# Patient Record
Sex: MALE | Race: ASIAN | NOT HISPANIC OR LATINO | Employment: FULL TIME | URBAN - METROPOLITAN AREA
[De-identification: names, ages, dates, MRNs, and addresses within clinical notes are randomized per-mention and may not be internally consistent; named-entity substitution may affect disease eponyms.]

---

## 2023-03-09 PROBLEM — K21.9 GERD (GASTROESOPHAGEAL REFLUX DISEASE): Status: ACTIVE | Noted: 2023-03-09

## 2023-03-09 PROBLEM — K21.9 CHEST PAIN DUE TO GERD: Status: ACTIVE | Noted: 2023-03-09

## 2023-03-09 PROBLEM — A04.8 H. PYLORI INFECTION: Status: ACTIVE | Noted: 2023-03-09

## 2023-03-09 PROBLEM — R07.9 CHEST PAIN DUE TO GERD: Status: ACTIVE | Noted: 2023-03-09

## 2023-03-09 RX ORDER — OMEPRAZOLE 40 MG/1
1 CAPSULE, DELAYED RELEASE ORAL DAILY
COMMUNITY
Start: 2020-05-20 | End: 2023-06-08 | Stop reason: WASHOUT

## 2023-03-09 RX ORDER — TETRACYCLINE HYDROCHLORIDE 500 MG/1
1 CAPSULE ORAL 4 TIMES DAILY
COMMUNITY
Start: 2020-06-10 | End: 2024-01-10 | Stop reason: WASHOUT

## 2023-03-10 ENCOUNTER — LAB (OUTPATIENT)
Dept: LAB | Facility: LAB | Age: 48
End: 2023-03-10
Payer: COMMERCIAL

## 2023-03-10 ENCOUNTER — OFFICE VISIT (OUTPATIENT)
Dept: PRIMARY CARE | Facility: CLINIC | Age: 48
End: 2023-03-10
Payer: COMMERCIAL

## 2023-03-10 VITALS — BODY MASS INDEX: 21.4 KG/M2 | TEMPERATURE: 97.8 F | WEIGHT: 130.6 LBS

## 2023-03-10 DIAGNOSIS — K21.00 GASTROESOPHAGEAL REFLUX DISEASE WITH ESOPHAGITIS WITHOUT HEMORRHAGE: Primary | ICD-10-CM

## 2023-03-10 DIAGNOSIS — R10.13 EPIGASTRIC PAIN: ICD-10-CM

## 2023-03-10 LAB
ALANINE AMINOTRANSFERASE (SGPT) (U/L) IN SER/PLAS: 16 U/L (ref 10–52)
ALBUMIN (G/DL) IN SER/PLAS: 4.6 G/DL (ref 3.4–5)
ALKALINE PHOSPHATASE (U/L) IN SER/PLAS: 50 U/L (ref 33–120)
ANION GAP IN SER/PLAS: 9 MMOL/L (ref 10–20)
ASPARTATE AMINOTRANSFERASE (SGOT) (U/L) IN SER/PLAS: 23 U/L (ref 9–39)
BASOPHILS (10*3/UL) IN BLOOD BY AUTOMATED COUNT: 0.04 X10E9/L (ref 0–0.1)
BASOPHILS/100 LEUKOCYTES IN BLOOD BY AUTOMATED COUNT: 1.3 % (ref 0–2)
BILIRUBIN TOTAL (MG/DL) IN SER/PLAS: 1 MG/DL (ref 0–1.2)
CALCIUM (MG/DL) IN SER/PLAS: 8.9 MG/DL (ref 8.6–10.3)
CARBON DIOXIDE, TOTAL (MMOL/L) IN SER/PLAS: 33 MMOL/L (ref 21–32)
CHLORIDE (MMOL/L) IN SER/PLAS: 102 MMOL/L (ref 98–107)
CREATININE (MG/DL) IN SER/PLAS: 0.83 MG/DL (ref 0.5–1.3)
EOSINOPHILS (10*3/UL) IN BLOOD BY AUTOMATED COUNT: 0.07 X10E9/L (ref 0–0.7)
EOSINOPHILS/100 LEUKOCYTES IN BLOOD BY AUTOMATED COUNT: 2.3 % (ref 0–6)
ERYTHROCYTE DISTRIBUTION WIDTH (RATIO) BY AUTOMATED COUNT: 13.2 % (ref 11.5–14.5)
ERYTHROCYTE MEAN CORPUSCULAR HEMOGLOBIN CONCENTRATION (G/DL) BY AUTOMATED: 32.4 G/DL (ref 32–36)
ERYTHROCYTE MEAN CORPUSCULAR VOLUME (FL) BY AUTOMATED COUNT: 90 FL (ref 80–100)
ERYTHROCYTES (10*6/UL) IN BLOOD BY AUTOMATED COUNT: 5 X10E12/L (ref 4.5–5.9)
GFR MALE: >90 ML/MIN/1.73M2
GLUCOSE (MG/DL) IN SER/PLAS: 91 MG/DL (ref 74–99)
HEMATOCRIT (%) IN BLOOD BY AUTOMATED COUNT: 45 % (ref 41–52)
HEMOGLOBIN (G/DL) IN BLOOD: 14.6 G/DL (ref 13.5–17.5)
IMMATURE GRANULOCYTES/100 LEUKOCYTES IN BLOOD BY AUTOMATED COUNT: 0 % (ref 0–0.9)
LEUKOCYTES (10*3/UL) IN BLOOD BY AUTOMATED COUNT: 3 X10E9/L (ref 4.4–11.3)
LYMPHOCYTES (10*3/UL) IN BLOOD BY AUTOMATED COUNT: 1.36 X10E9/L (ref 1.2–4.8)
LYMPHOCYTES/100 LEUKOCYTES IN BLOOD BY AUTOMATED COUNT: 45.2 % (ref 13–44)
MONOCYTES (10*3/UL) IN BLOOD BY AUTOMATED COUNT: 0.22 X10E9/L (ref 0.1–1)
MONOCYTES/100 LEUKOCYTES IN BLOOD BY AUTOMATED COUNT: 7.3 % (ref 2–10)
NEUTROPHILS (10*3/UL) IN BLOOD BY AUTOMATED COUNT: 1.32 X10E9/L (ref 1.2–7.7)
NEUTROPHILS/100 LEUKOCYTES IN BLOOD BY AUTOMATED COUNT: 43.9 % (ref 40–80)
PLATELETS (10*3/UL) IN BLOOD AUTOMATED COUNT: 237 X10E9/L (ref 150–450)
POTASSIUM (MMOL/L) IN SER/PLAS: 3.8 MMOL/L (ref 3.5–5.3)
PROTEIN TOTAL: 7.4 G/DL (ref 6.4–8.2)
SODIUM (MMOL/L) IN SER/PLAS: 140 MMOL/L (ref 136–145)
THYROTROPIN (MIU/L) IN SER/PLAS BY DETECTION LIMIT <= 0.05 MIU/L: 1.71 MIU/L (ref 0.44–3.98)
UREA NITROGEN (MG/DL) IN SER/PLAS: 19 MG/DL (ref 6–23)

## 2023-03-10 PROCEDURE — 80053 COMPREHEN METABOLIC PANEL: CPT

## 2023-03-10 PROCEDURE — 99213 OFFICE O/P EST LOW 20 MIN: CPT | Performed by: INTERNAL MEDICINE

## 2023-03-10 PROCEDURE — 84443 ASSAY THYROID STIM HORMONE: CPT

## 2023-03-10 PROCEDURE — 85025 COMPLETE CBC W/AUTO DIFF WBC: CPT

## 2023-03-10 PROCEDURE — 36415 COLL VENOUS BLD VENIPUNCTURE: CPT

## 2023-03-10 PROCEDURE — 1036F TOBACCO NON-USER: CPT | Performed by: INTERNAL MEDICINE

## 2023-03-10 RX ORDER — PANTOPRAZOLE SODIUM 40 MG/1
40 TABLET, DELAYED RELEASE ORAL DAILY
Qty: 30 TABLET | Refills: 1 | Status: SHIPPED | OUTPATIENT
Start: 2023-03-10 | End: 2023-04-03

## 2023-03-10 ASSESSMENT — ENCOUNTER SYMPTOMS
NUMBNESS: 0
PALPITATIONS: 0
MYALGIAS: 0
RESPIRATORY NEGATIVE: 1
DIARRHEA: 0
PSYCHIATRIC NEGATIVE: 1
EYES NEGATIVE: 1
ABDOMINAL DISTENTION: 0
NEUROLOGICAL NEGATIVE: 1
SLEEP DISTURBANCE: 0
JOINT SWELLING: 0
WEAKNESS: 0
HEMATURIA: 0
ACTIVITY CHANGE: 0
VOICE CHANGE: 0
DIFFICULTY URINATING: 0
EYE DISCHARGE: 0
PHOTOPHOBIA: 0
COLOR CHANGE: 0
AGITATION: 0
DYSPHORIC MOOD: 0
APNEA: 0
FREQUENCY: 0
ALLERGIC/IMMUNOLOGIC NEGATIVE: 1
TROUBLE SWALLOWING: 0
BACK PAIN: 0
LIGHT-HEADEDNESS: 0
CHEST TIGHTNESS: 0
SHORTNESS OF BREATH: 0
FLANK PAIN: 0
HEADACHES: 0
CARDIOVASCULAR NEGATIVE: 1
BLOOD IN STOOL: 0
NERVOUS/ANXIOUS: 0
ENDOCRINE NEGATIVE: 1
DIZZINESS: 0
STRIDOR: 0
MUSCULOSKELETAL NEGATIVE: 1
NECK STIFFNESS: 0
HEMATOLOGIC/LYMPHATIC NEGATIVE: 1
RECTAL PAIN: 0
CONFUSION: 0
DYSURIA: 0
SORE THROAT: 0
POLYPHAGIA: 0
TREMORS: 0
WHEEZING: 0
ABDOMINAL PAIN: 1
NECK PAIN: 0
CONSTITUTIONAL NEGATIVE: 1
UNEXPECTED WEIGHT CHANGE: 0
FATIGUE: 0
ANAL BLEEDING: 0
SINUS PRESSURE: 0
COUGH: 0
BRUISES/BLEEDS EASILY: 0
HYPERACTIVE: 0
SEIZURES: 0
SPEECH DIFFICULTY: 0
CONSTIPATION: 0
NAUSEA: 1
APPETITE CHANGE: 0
POLYDIPSIA: 0
RHINORRHEA: 0

## 2023-03-13 ENCOUNTER — LAB (OUTPATIENT)
Dept: LAB | Facility: LAB | Age: 48
End: 2023-03-13
Payer: COMMERCIAL

## 2023-03-13 ENCOUNTER — TELEPHONE (OUTPATIENT)
Dept: PRIMARY CARE | Facility: CLINIC | Age: 48
End: 2023-03-13

## 2023-03-13 DIAGNOSIS — K21.00 GASTROESOPHAGEAL REFLUX DISEASE WITH ESOPHAGITIS WITHOUT HEMORRHAGE: ICD-10-CM

## 2023-03-13 PROCEDURE — 83013 H PYLORI (C-13) BREATH: CPT

## 2023-03-13 NOTE — TELEPHONE ENCOUNTER
Pt needs pre authorization for CT. Pt needs this week. Insurance told Pt that he needs pre auth from  So Pt can have  CT done sooner than March 14th

## 2023-03-14 LAB — H. PYLORI UBIT: NEGATIVE

## 2023-03-16 DIAGNOSIS — R10.13 EPIGASTRIC PAIN: Primary | ICD-10-CM

## 2023-03-16 DIAGNOSIS — R63.4 WEIGHT LOSS: ICD-10-CM

## 2023-03-16 DIAGNOSIS — D72.819 LEUKOPENIA, UNSPECIFIED TYPE: ICD-10-CM

## 2023-04-01 DIAGNOSIS — K21.00 GASTROESOPHAGEAL REFLUX DISEASE WITH ESOPHAGITIS WITHOUT HEMORRHAGE: ICD-10-CM

## 2023-04-03 RX ORDER — PANTOPRAZOLE SODIUM 40 MG/1
TABLET, DELAYED RELEASE ORAL
Qty: 30 TABLET | Refills: 0 | Status: SHIPPED | OUTPATIENT
Start: 2023-04-03 | End: 2024-01-24

## 2023-04-19 ENCOUNTER — APPOINTMENT (OUTPATIENT)
Dept: PRIMARY CARE | Facility: CLINIC | Age: 48
End: 2023-04-19
Payer: COMMERCIAL

## 2023-06-08 ENCOUNTER — LAB (OUTPATIENT)
Dept: LAB | Facility: LAB | Age: 48
End: 2023-06-08
Payer: COMMERCIAL

## 2023-06-08 ENCOUNTER — OFFICE VISIT (OUTPATIENT)
Dept: PRIMARY CARE | Facility: CLINIC | Age: 48
End: 2023-06-08
Payer: COMMERCIAL

## 2023-06-08 VITALS
BODY MASS INDEX: 22.3 KG/M2 | SYSTOLIC BLOOD PRESSURE: 122 MMHG | DIASTOLIC BLOOD PRESSURE: 84 MMHG | TEMPERATURE: 97.6 F | HEART RATE: 74 BPM | WEIGHT: 134 LBS

## 2023-06-08 DIAGNOSIS — K21.00 GASTROESOPHAGEAL REFLUX DISEASE WITH ESOPHAGITIS WITHOUT HEMORRHAGE: Primary | ICD-10-CM

## 2023-06-08 DIAGNOSIS — R07.0 THROAT PAIN: ICD-10-CM

## 2023-06-08 DIAGNOSIS — K21.00 GASTROESOPHAGEAL REFLUX DISEASE WITH ESOPHAGITIS WITHOUT HEMORRHAGE: ICD-10-CM

## 2023-06-08 PROBLEM — I77.4 MEDIAN ARCUATE LIGAMENT SYNDROME (CMS-HCC): Status: ACTIVE | Noted: 2023-06-08

## 2023-06-08 PROCEDURE — 80053 COMPREHEN METABOLIC PANEL: CPT

## 2023-06-08 PROCEDURE — 99214 OFFICE O/P EST MOD 30 MIN: CPT | Performed by: STUDENT IN AN ORGANIZED HEALTH CARE EDUCATION/TRAINING PROGRAM

## 2023-06-08 PROCEDURE — 85025 COMPLETE CBC W/AUTO DIFF WBC: CPT

## 2023-06-08 PROCEDURE — 36415 COLL VENOUS BLD VENIPUNCTURE: CPT

## 2023-06-08 PROCEDURE — 1036F TOBACCO NON-USER: CPT | Performed by: STUDENT IN AN ORGANIZED HEALTH CARE EDUCATION/TRAINING PROGRAM

## 2023-06-08 NOTE — PATIENT INSTRUCTIONS
Please stop at the lab ([address]) to complete your blood and/or urine work that I've ordered for you.    I will contact you with the results at my soonest convenience. I strongly urge you to use [email or web address] as this is the quickest and easiest way to access your results and recieve my correspondences.    Follow up with Dr. Lock and Dr. Emmanuel for workup of the median arcuate ligament syndrome.

## 2023-06-08 NOTE — PROGRESS NOTES
Subjective   Patient ID: Jess Gaines is a 47 y.o. male who presents for No chief complaint on file..    HPI   Complains of mild throat discomfort. Reports swollen glands in his throat that are resolving, much better today than they were a week ago. Also complains of subacute on chronic abdominal pain. See GI notes and vascular notes; there is concern for median arcuate ligament syndrome based on the studies he's had. He has lots of questions about this.     Review of Systems    Objective   /84   Pulse 74   Temp 36.4 °C (97.6 °F)   Wt 60.8 kg (134 lb)   BMI 22.30 kg/m²     Physical Exam  Gen: well developed in NAD. AAO x3.  HEENT: NC/AT. Anicteric sclera, symmetric pupils. MMM no thrush.  Neck: Soft, supple. No LAD. No goiter.   CV: RRR nl s1s2 no m/r/g  Pulm: CTAB no w/r/r, good air exchange  GI: soft NTND BS+ no hsm  Ext: WWP no edema  Neuro: II-XII grossly intact, nonfocal systemic findings  MSK: 5/5 strength b/l UE and LE  Gait: unremarkable    Assessment/Plan   Self limiting URI likely the cause behind his mild swollen glands that have resolved.    Unclear etiology of his abd pain, defer to his specialists on this; there is concern for  median arcuate ligament syndrome based on his studies. Recommend he follow up with pain mgmt and vascular and surgery.

## 2023-06-09 LAB
ALANINE AMINOTRANSFERASE (SGPT) (U/L) IN SER/PLAS: 21 U/L (ref 10–52)
ALBUMIN (G/DL) IN SER/PLAS: 4.8 G/DL (ref 3.4–5)
ALKALINE PHOSPHATASE (U/L) IN SER/PLAS: 57 U/L (ref 33–120)
ANION GAP IN SER/PLAS: 14 MMOL/L (ref 10–20)
ASPARTATE AMINOTRANSFERASE (SGOT) (U/L) IN SER/PLAS: 21 U/L (ref 9–39)
BASOPHILS (10*3/UL) IN BLOOD BY AUTOMATED COUNT: 0.04 X10E9/L (ref 0–0.1)
BASOPHILS/100 LEUKOCYTES IN BLOOD BY AUTOMATED COUNT: 0.8 % (ref 0–2)
BILIRUBIN TOTAL (MG/DL) IN SER/PLAS: 0.7 MG/DL (ref 0–1.2)
CALCIUM (MG/DL) IN SER/PLAS: 9.6 MG/DL (ref 8.6–10.6)
CARBON DIOXIDE, TOTAL (MMOL/L) IN SER/PLAS: 30 MMOL/L (ref 21–32)
CHLORIDE (MMOL/L) IN SER/PLAS: 102 MMOL/L (ref 98–107)
CREATININE (MG/DL) IN SER/PLAS: 0.93 MG/DL (ref 0.5–1.3)
EOSINOPHILS (10*3/UL) IN BLOOD BY AUTOMATED COUNT: 0.09 X10E9/L (ref 0–0.7)
EOSINOPHILS/100 LEUKOCYTES IN BLOOD BY AUTOMATED COUNT: 1.8 % (ref 0–6)
ERYTHROCYTE DISTRIBUTION WIDTH (RATIO) BY AUTOMATED COUNT: 12 % (ref 11.5–14.5)
ERYTHROCYTE MEAN CORPUSCULAR HEMOGLOBIN CONCENTRATION (G/DL) BY AUTOMATED: 32.6 G/DL (ref 32–36)
ERYTHROCYTE MEAN CORPUSCULAR VOLUME (FL) BY AUTOMATED COUNT: 88 FL (ref 80–100)
ERYTHROCYTES (10*6/UL) IN BLOOD BY AUTOMATED COUNT: 4.9 X10E12/L (ref 4.5–5.9)
GFR MALE: >90 ML/MIN/1.73M2
GLUCOSE (MG/DL) IN SER/PLAS: 98 MG/DL (ref 74–99)
HEMATOCRIT (%) IN BLOOD BY AUTOMATED COUNT: 42.9 % (ref 41–52)
HEMOGLOBIN (G/DL) IN BLOOD: 14 G/DL (ref 13.5–17.5)
IMMATURE GRANULOCYTES/100 LEUKOCYTES IN BLOOD BY AUTOMATED COUNT: 0.2 % (ref 0–0.9)
LEUKOCYTES (10*3/UL) IN BLOOD BY AUTOMATED COUNT: 5 X10E9/L (ref 4.4–11.3)
LYMPHOCYTES (10*3/UL) IN BLOOD BY AUTOMATED COUNT: 2.32 X10E9/L (ref 1.2–4.8)
LYMPHOCYTES/100 LEUKOCYTES IN BLOOD BY AUTOMATED COUNT: 46.2 % (ref 13–44)
MONOCYTES (10*3/UL) IN BLOOD BY AUTOMATED COUNT: 0.32 X10E9/L (ref 0.1–1)
MONOCYTES/100 LEUKOCYTES IN BLOOD BY AUTOMATED COUNT: 6.4 % (ref 2–10)
NEUTROPHILS (10*3/UL) IN BLOOD BY AUTOMATED COUNT: 2.24 X10E9/L (ref 1.2–7.7)
NEUTROPHILS/100 LEUKOCYTES IN BLOOD BY AUTOMATED COUNT: 44.6 % (ref 40–80)
NRBC (PER 100 WBCS) BY AUTOMATED COUNT: 0 /100 WBC (ref 0–0)
PLATELETS (10*3/UL) IN BLOOD AUTOMATED COUNT: 224 X10E9/L (ref 150–450)
POTASSIUM (MMOL/L) IN SER/PLAS: 3.8 MMOL/L (ref 3.5–5.3)
PROTEIN TOTAL: 7.4 G/DL (ref 6.4–8.2)
SODIUM (MMOL/L) IN SER/PLAS: 142 MMOL/L (ref 136–145)
UREA NITROGEN (MG/DL) IN SER/PLAS: 25 MG/DL (ref 6–23)

## 2023-06-14 ENCOUNTER — OFFICE VISIT (OUTPATIENT)
Dept: PRIMARY CARE | Facility: CLINIC | Age: 48
End: 2023-06-14
Payer: COMMERCIAL

## 2023-06-14 VITALS
HEART RATE: 81 BPM | SYSTOLIC BLOOD PRESSURE: 128 MMHG | BODY MASS INDEX: 22.63 KG/M2 | TEMPERATURE: 97.5 F | OXYGEN SATURATION: 95 % | WEIGHT: 136 LBS | DIASTOLIC BLOOD PRESSURE: 74 MMHG

## 2023-06-14 DIAGNOSIS — J32.9 SINUSITIS, UNSPECIFIED CHRONICITY, UNSPECIFIED LOCATION: Primary | ICD-10-CM

## 2023-06-14 PROBLEM — I99.8: Status: ACTIVE | Noted: 2023-06-14

## 2023-06-14 PROBLEM — R09.89 ABDOMINAL BRUIT: Status: ACTIVE | Noted: 2023-06-14

## 2023-06-14 PROBLEM — R63.4 WEIGHT LOSS: Status: ACTIVE | Noted: 2023-06-14

## 2023-06-14 PROBLEM — A04.8 H. PYLORI INFECTION: Status: RESOLVED | Noted: 2023-03-09 | Resolved: 2023-06-14

## 2023-06-14 PROBLEM — R10.11 ABDOMINAL PAIN, ACUTE, RIGHT UPPER QUADRANT: Status: ACTIVE | Noted: 2023-06-14

## 2023-06-14 PROCEDURE — 1036F TOBACCO NON-USER: CPT | Performed by: STUDENT IN AN ORGANIZED HEALTH CARE EDUCATION/TRAINING PROGRAM

## 2023-06-14 PROCEDURE — 99214 OFFICE O/P EST MOD 30 MIN: CPT | Performed by: STUDENT IN AN ORGANIZED HEALTH CARE EDUCATION/TRAINING PROGRAM

## 2023-06-14 RX ORDER — FLUTICASONE PROPIONATE 50 MCG
SPRAY, SUSPENSION (ML) NASAL
COMMUNITY
Start: 2023-06-12 | End: 2024-01-25 | Stop reason: ALTCHOICE

## 2023-06-14 RX ORDER — AMOXICILLIN AND CLAVULANATE POTASSIUM 875; 125 MG/1; MG/1
875 TABLET, FILM COATED ORAL 2 TIMES DAILY
Qty: 14 TABLET | Refills: 0 | Status: SHIPPED | OUTPATIENT
Start: 2023-06-14 | End: 2023-06-21

## 2023-06-14 NOTE — PATIENT INSTRUCTIONS
You have a sinus infection, and given the severity and duration of symptoms it is reasonable to treat with antibiotics. I have written for a short course of antibiotics (Augmentin if no penicillin allergy, or Doxycycline if you have a penicillin allergy). Come back in if you aren't feeling better once you have completed the full course.

## 2023-06-15 ENCOUNTER — APPOINTMENT (OUTPATIENT)
Dept: PRIMARY CARE | Facility: CLINIC | Age: 48
End: 2023-06-15
Payer: COMMERCIAL

## 2023-08-04 ENCOUNTER — HOSPITAL ENCOUNTER (OUTPATIENT)
Dept: DATA CONVERSION | Facility: HOSPITAL | Age: 48
End: 2023-08-04
Attending: SURGERY | Admitting: SURGERY
Payer: COMMERCIAL

## 2023-08-04 DIAGNOSIS — Z86.16 PERSONAL HISTORY OF COVID-19: ICD-10-CM

## 2023-08-04 DIAGNOSIS — K59.00 CONSTIPATION, UNSPECIFIED: ICD-10-CM

## 2023-08-04 DIAGNOSIS — R10.84 GENERALIZED ABDOMINAL PAIN: ICD-10-CM

## 2023-08-04 DIAGNOSIS — R10.13 EPIGASTRIC PAIN: ICD-10-CM

## 2023-08-04 DIAGNOSIS — K22.89 OTHER SPECIFIED DISEASE OF ESOPHAGUS: ICD-10-CM

## 2023-08-04 DIAGNOSIS — K21.9 GASTRO-ESOPHAGEAL REFLUX DISEASE WITHOUT ESOPHAGITIS: ICD-10-CM

## 2023-08-04 DIAGNOSIS — Z12.11 ENCOUNTER FOR SCREENING FOR MALIGNANT NEOPLASM OF COLON: ICD-10-CM

## 2023-08-04 DIAGNOSIS — K21.00 GASTRO-ESOPHAGEAL REFLUX DISEASE WITH ESOPHAGITIS, WITHOUT BLEEDING: ICD-10-CM

## 2023-08-04 DIAGNOSIS — K29.50 UNSPECIFIED CHRONIC GASTRITIS WITHOUT BLEEDING: ICD-10-CM

## 2023-08-08 LAB
COMPLETE PATHOLOGY REPORT: NORMAL
CONVERTED CLINICAL DIAGNOSIS-HISTORY: NORMAL
CONVERTED FINAL DIAGNOSIS: NORMAL
CONVERTED FINAL REPORT PDF LINK TO COPY AND PASTE: NORMAL
CONVERTED GROSS DESCRIPTION: NORMAL

## 2023-09-25 ENCOUNTER — APPOINTMENT (OUTPATIENT)
Dept: PRIMARY CARE | Facility: CLINIC | Age: 48
End: 2023-09-25
Payer: COMMERCIAL

## 2023-09-30 NOTE — H&P
History of Present Illness:   History Present Illness:  Reason for surgery: dysphagia   HPI:     48 yo male with progressive dysphagia that started after had covid last year. He also describes burning and worsening dysphagia to  solids. He tried omeprazole without relief. then pantoprazole and sucralfate helped. Through this time he had some intermittent ruq  pain radiating to back. Woke him from sleep once. Sounds like GB even though the u/s showed sludge, hida normal. CT shows  large stool burden otherwise normal. But the stool burden where he is tender. So he has been off the sucralfate and the dysphagia  is worsening. will resume. For the ruq pain advised to avoid fatty fried foods. And then I suspect the tenderness at umbilicus due to  constipation. Will start a bowel regimen to see how he does. For workup will do esophagram/ugi and egd/colon. Wife has colon  cancer gene and he does not know his family hisotry. Possbily will also get a manometry. MALS is not in my differential. Will see him  back after studies.    Allergies:        Allergies:  ·  No Known Allergies :     Home Medication Review:   Home Medications Reviewed: yes     Impression/Procedure:   ·  Impression and Planned Procedure: EGD and colonoscopy with esophagram and UGI       ERAS (Enhanced Recovery After Surgery):  ·  ERAS Patient: yes   ·  CPM/PAT Utilization: yes   ·  Immunonutrition Recovery Drink Utilization: no   ·  Carbohydrate Supplement Drink Utilization: no       Physical Exam by System:    Constitutional: Well developed, awake/alert/oriented  x3, no distress, alert and cooperative  elderly appearing   Eyes: PERRL, EOMI, clear sclera   ENMT: mucous membranes moist, no apparent injury,  no lesions seen   Head/Neck: Neck supple, no apparent injury, thyroid  without mass or tenderness, No JVD, trachea midline, no bruits   Respiratory/Thorax: Patent airways, CTAB, normal  breath sounds with good chest expansion, thorax symmetric    Cardiovascular: Regular, rate and rhythm, no murmurs,  2+ equal pulses of the extremities, normal S 1and S 2   Gastrointestinal: Nondistended, soft, non-tender,  no rebound tenderness or guarding, no masses palpable, no organomegaly, +BS, no bruits   Genitourinary: No Discharge, vesicles or other abnormalities   Musculoskeletal: ROM intact, no joint swelling, normal  strength   Extremities: normal extremities, no cyanosis edema,  contusions or wounds, no clubbing   Neurological: alert and oriented x3, intact senses,  motor, response and reflexes, normal strength   Skin: Warm and dry, no lesions, no rashes     Consent:   COVID-19 Consent:  ·  COVID-19 Risk Consent Surgeon has reviewed key risks related to the risk of terrence COVID-19 and if they contract COVID-19 what the risks are.       Electronic Signatures:  Griselda Grant)  (Signed 04-Aug-2023 06:26)   Authored: History of Present Illness, Allergies, Home  Medication Review, Impression/Procedure, ERAS, Physical Exam, Consent, Note Completion      Last Updated: 04-Aug-2023 06:26 by Griselda Grant)

## 2023-11-22 ENCOUNTER — APPOINTMENT (OUTPATIENT)
Dept: SURGERY | Facility: CLINIC | Age: 48
End: 2023-11-22
Payer: COMMERCIAL

## 2023-12-13 ENCOUNTER — APPOINTMENT (OUTPATIENT)
Dept: SURGERY | Facility: CLINIC | Age: 48
End: 2023-12-13
Payer: COMMERCIAL

## 2024-01-10 ENCOUNTER — OFFICE VISIT (OUTPATIENT)
Dept: PRIMARY CARE | Facility: CLINIC | Age: 49
End: 2024-01-10
Payer: COMMERCIAL

## 2024-01-10 VITALS
HEART RATE: 77 BPM | SYSTOLIC BLOOD PRESSURE: 129 MMHG | TEMPERATURE: 97.1 F | WEIGHT: 140 LBS | BODY MASS INDEX: 21.97 KG/M2 | OXYGEN SATURATION: 97 % | HEIGHT: 67 IN | DIASTOLIC BLOOD PRESSURE: 84 MMHG

## 2024-01-10 DIAGNOSIS — K21.9 LPRD (LARYNGOPHARYNGEAL REFLUX DISEASE): Primary | ICD-10-CM

## 2024-01-10 DIAGNOSIS — J32.9 SINUSITIS, UNSPECIFIED CHRONICITY, UNSPECIFIED LOCATION: ICD-10-CM

## 2024-01-10 PROCEDURE — 99214 OFFICE O/P EST MOD 30 MIN: CPT | Performed by: STUDENT IN AN ORGANIZED HEALTH CARE EDUCATION/TRAINING PROGRAM

## 2024-01-10 PROCEDURE — 1036F TOBACCO NON-USER: CPT | Performed by: STUDENT IN AN ORGANIZED HEALTH CARE EDUCATION/TRAINING PROGRAM

## 2024-01-10 RX ORDER — AZELASTINE HYDROCHLORIDE, FLUTICASONE PROPIONATE 137; 50 UG/1; UG/1
1 SPRAY, METERED NASAL 2 TIMES DAILY
Qty: 23 EACH | Refills: 1 | Status: SHIPPED | OUTPATIENT
Start: 2024-01-10

## 2024-01-10 RX ORDER — AMOXICILLIN AND CLAVULANATE POTASSIUM 875; 125 MG/1; MG/1
875 TABLET, FILM COATED ORAL 2 TIMES DAILY
Qty: 14 TABLET | Refills: 0 | Status: SHIPPED | OUTPATIENT
Start: 2024-01-10 | End: 2024-01-17

## 2024-01-10 NOTE — PROGRESS NOTES
"Subjective   Patient ID: Jess Gaines is a 48 y.o. male who presents for No chief complaint on file..    HPI   Complains of persistent throat discomfort. Reports swollen glands in his throat that are resolving, again he perseverates on these. He also reports hoarse voice compared to baseline. Constantly clearing his throat. Some sinus pressure too. Has had extensive workup with GI, requesting ENT referral for triple endoscopy. Also requesting abx. Using nasal spray.     PMHx, FHx, Social Hx, Surg Hx personally reviewed at this appointment. No pertinent findings and/or changes from prior (if applicable).    ROS: Denies wt gain/loss f/c HA LoC CP SOB NVDC. See HPI above, and scanned sheet (if applicable). All other systems are reviewed and are without complaint.      Review of Systems    Objective   /84   Pulse 77   Temp 36.2 °C (97.1 °F)   Ht 1.702 m (5' 7\")   Wt 63.5 kg (140 lb)   SpO2 97%   BMI 21.93 kg/m²     Physical Exam  Gen: well developed in NAD. AAO x3.  HEENT: NC/AT. Anicteric sclera, symmetric pupils. MMM no thrush.  Neck: Soft, supple. No LAD. No goiter.   CV: RRR nl s1s2 no m/r/g  Pulm: CTAB no w/r/r, good air exchange  GI: soft NTND BS+ no hsm  Ext: WWP no edema  Neuro: II-XII grossly intact, nonfocal systemic findings  MSK: 5/5 strength b/l UE and LE  Gait: unremarkable      Assessment/Plan   Persistent laryngeal and esophageal symptoms. Has mild GERD. Likely LRPD. Given his persistent symptoms, will refer to ENT for further evaluation. Will also treat with abx at pt request.          "

## 2024-01-10 NOTE — PATIENT INSTRUCTIONS
I have referred you to ENT for an ear, nose, throat, or sinus problem. Please call their offices at [phone] to schedule an appointment.     I have added or changed your medications today. See the medication section of this packet for full details.

## 2024-01-18 ENCOUNTER — APPOINTMENT (OUTPATIENT)
Dept: PRIMARY CARE | Facility: CLINIC | Age: 49
End: 2024-01-18
Payer: COMMERCIAL

## 2024-01-24 DIAGNOSIS — K21.00 GASTROESOPHAGEAL REFLUX DISEASE WITH ESOPHAGITIS WITHOUT HEMORRHAGE: ICD-10-CM

## 2024-01-24 RX ORDER — PANTOPRAZOLE SODIUM 40 MG/1
40 TABLET, DELAYED RELEASE ORAL
Qty: 90 TABLET | Refills: 3 | Status: SHIPPED | OUTPATIENT
Start: 2024-01-24 | End: 2024-05-17

## 2024-01-25 ENCOUNTER — OFFICE VISIT (OUTPATIENT)
Dept: OTOLARYNGOLOGY | Facility: HOSPITAL | Age: 49
End: 2024-01-25
Payer: COMMERCIAL

## 2024-01-25 VITALS — WEIGHT: 137.5 LBS | BODY MASS INDEX: 21.58 KG/M2 | TEMPERATURE: 96.8 F | HEIGHT: 67 IN

## 2024-01-25 DIAGNOSIS — R09.82 POST-NASAL DRIP: ICD-10-CM

## 2024-01-25 DIAGNOSIS — R49.0 DYSPHONIA: ICD-10-CM

## 2024-01-25 DIAGNOSIS — K21.9 LPRD (LARYNGOPHARYNGEAL REFLUX DISEASE): ICD-10-CM

## 2024-01-25 DIAGNOSIS — R13.10 DYSPHAGIA, UNSPECIFIED TYPE: Primary | ICD-10-CM

## 2024-01-25 PROCEDURE — 31579 LARYNGOSCOPY TELESCOPIC: CPT | Performed by: OTOLARYNGOLOGY

## 2024-01-25 PROCEDURE — 99214 OFFICE O/P EST MOD 30 MIN: CPT | Performed by: OTOLARYNGOLOGY

## 2024-01-25 PROCEDURE — 99204 OFFICE O/P NEW MOD 45 MIN: CPT | Performed by: OTOLARYNGOLOGY

## 2024-01-25 PROCEDURE — 1036F TOBACCO NON-USER: CPT | Performed by: OTOLARYNGOLOGY

## 2024-01-25 RX ORDER — FLUTICASONE PROPIONATE 50 MCG
2 SPRAY, SUSPENSION (ML) NASAL DAILY
Qty: 16 G | Refills: 11 | Status: SHIPPED | OUTPATIENT
Start: 2024-01-25 | End: 2025-01-24

## 2024-01-25 ASSESSMENT — PATIENT HEALTH QUESTIONNAIRE - PHQ9
2. FEELING DOWN, DEPRESSED OR HOPELESS: NOT AT ALL
SUM OF ALL RESPONSES TO PHQ9 QUESTIONS 1 & 2: 0
1. LITTLE INTEREST OR PLEASURE IN DOING THINGS: NOT AT ALL

## 2024-01-25 NOTE — PROGRESS NOTES
Patient: Jess Gaines   MRN: [id number] YOB: 1975   Sex: male Age: 48 y.o.  Date of Service: 2024       ASSESSMENT AND PLAN  I discussed the findings with Jess Gaines and have recommended the followin. Dysphagia to solids and pills, history of GERD symptoms on PPI, esophagram without significant UES pathology though evaluation was limited.  - MBS ordered  - Follow-up after MBS to review next steps    2. 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 His throat clearing is behavioral as I do not see significant increased mucus today on laryngoscopy. I do not suspect LPRD. I have recommended voice therapy with our SLPs to start  - SLP referral for voice therapy   - Increase hydration and humidification  - Throat clearing and vocal hygiene strategies provided  - Repeat strobe on next visit    3. Post nasal drip, previously improved on flonase  - Flonase prescribed      CHIEF COMPLAINT  Chief Complaint   Patient presents with    lprd     Needs to clear throat constantly  Dry throat  Voice change    Chest Pain     Just finished 7 days of Amoxicillin       HISTORY OF PRESENT ILLNESS  Jess Gaines is a 48 y.o. male referred by Román Patrick MD for evaluation of dysphagia and dysphonia.  The patient reports since 2022 he has been having reflux symptoms and trouble swallowing after COVID. Reflux symptoms since improved after PPI.    The dysphagia history includes:      Dysphagia for solids               yes, daily     Dysphagia for liquids              large amounts of liquid    Dysphagia for pills                  yes, breaks up his medications  Associated weight loss            no    Recent pneumonia/bronchitis  no  GERD/Acid reflux   Yes well controlled on PPI    Also notes some sensation of post nasal drop and sinus pressure.  "That was improved in the past with Flonase, which he is no longer using.    Dysphonia around the same time frame. 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 Denies pain with speaking but does note some irritation in his right neck. Thinks his right submandibular gland might be enlarged. No fevers or overlying erythema noted.    He has previously seen Dr. Grant 8/16/23. EGD 8/4/23 with regular Z line and non severe reflex esophagitis.     SH: never smoker    ADDITIONAL HISTORY  Past Medical History  He has no past medical history on file. Surgical History  He has no past surgical history on file.   Social History  He reports that he has never smoked. He has never used smokeless tobacco. He reports that he does not currently use alcohol. He reports that he does not currently use drugs. Allergies  Patient has no known allergies.     Family History  No family history on file.     REVIEW OF SYSTEMS  All 10 systems were reviewed and negative except for above.      PHYSICAL EXAM  ENT Physical Exam   GENERAL: Well-nourished and developed, alert and appropriate, no distress, voice [id number]  RESPIRATORY: Breathing quietly, no stridor  HEAD: Normocephalic atraumatic  FACE: Symmetric, no masses or lesions  EYES:  Pupils reactive, sclera clear, external ocular muscles intact, no nystagmus.    EARS:  Pinnae normal. External auditory canals clear and tympanic membranes intact.  NOSE:  No anterior lesions, masses or polyps.  ORAL CAVITY/OROPHARYNX:  Buccal mucosa is moist without lesions or masses, tongue midline and palate elevates symmetrically. Tongue mobility intact. Submandibular glands normal to palpation, normal salivary flow  NECK:  Soft. There is no lymphadenopathy or thyromegaly.  Moderated tenderness R>L thyrohyoid space  NEUROLOGIC:  Cranial nerves II-XII grossly intact.       Last Recorded Vitals  Temperature 36 °C (96.8 °F), height 1.702 m (5' 7\"), weight 62.4 kg (137 lb 8 " sharlene.    RESULTS    Patient Reported Outcome Measures  N/A    Laboratory, Radiology, and Pathology  I personally reviewed the following results, with the following interpretation:   Esophagram 8/10/23 - no significant pharyngoesophageal dysfunction, mild intraesophageal reflux          PROCEDURES  Flexible Stroboscopy In Clinic    Date/Time: 1/25/2024 9:30 AM    Performed by: Kiera Serna MD  Authorized by: Kiera Serna MD       Flexible Fiberoptic Laryngoscopy with Stroboscopy    Patient failed a mirror exam due to limitations of equipment and the need for stroboscopy to assess glottic vibration and closure.     PREOPERATIVE DIAGNOSIS: Dysphonia    POSTOPERATIVE DIAGNOSIS: Same    PROCEDURE:  Strobovideolaryngoscopy    ANESTHESIA:  Topical    COMPLICATIONS:  None    SPECIMENS:  None    PROCEDURE IN DETAIL: The patient was seated in an upright position.  The nasal cavity was topically decongested and anesthetized.  The stroboscopic microphone was held to the neck at the level of the larynx.  The distal chip video laryngoscope was passed through the nasal cavity.  The nasal cavity and nasopharynx were within normal limits except noted below.  The following findings on stroboscopy were noted:      Appearance of pharynx/larynx/Other Structural Lesions: small right arytenoid granuloma   Vocal Fold Mobility               Right VF: mobile               Left VF: mobile   TVF Appearance               Edema/Erythema: mild edema               Lesions/vibratory margin irregularities: mild atrophy   Glottic Closure Pattern: complete    Vibration:               Phase: symmetric    Periodicity: regular                Amplitude: normal                Waveform: normal     Muscle Tension Patterns:  moderate   Other abnormal findings: per above    The patient tolerated the procedure well.       ----------------------------------------------------------------------  Kiera Serna MD, MAEd    [facility]  Department of Otolaryngology - Head and Neck Surgery  [facility]    The total time I spent in care of this patient today (excluding time spent on other billable services) is as follows:    Time Spent  Prep time on day of patient encounter: 5 minutes  Time spent directly with patient, family or caregiver: 25 minutes  Additional Time Spent on Patient Care Activities: 5 minutes  Documentation Time: 10 minutes  Other Time Spent: 0 minutes  Total: 45 minutes

## 2024-01-25 NOTE — PATIENT INSTRUCTIONS
"    NASAL SPRAY USE INSTRUCTIONS    · Blow your nose on both sides to clear any debris or mucous  · Prime and activate the delivery device as recommended by the   · Position your head by tilting forward (this aligns the medication more vertically and makes it easier to use all the medication from the bottom of the bottle each month)  · Aim the applicator tip about 30-45 degrees from the floor of the nose and direct the spray to the outer corner of the eye on the same side (right side to outside of the right eye,       left side to outside of left eye)       - This technique helps to avoid spraying the septum (structure that divides one side of the nose from the other) that can cause irritation and bleeding     - Do not spray straight up or straight back into your nose         · NASAL STEROIDS (example: Flonase, Nasacort, Rhinocort) - Consistent utilization is important for maximal benefit (either 1 puff each nostril twice per day or 2 sprays each     nostril once per day)  · TOPICAL ANTIHISTAMINES (example: Azelastine, Patanase) and Ipratropium - can be utilized as needed for symptoms     \"[facility] is pleased to meet your health care needs.  We strive to deliver the highest quality and most value based care possible.  Thank you for giving us the opportunity to serve you.\"      You are a chronic throat clearer and you are not alone! The causes of chronic throat clearing include acid reflux (laryngopharyngeal reflux), allergies, environmental irritants such as tobacco smoke and air pollution, and asthma. If present for a long time, throat clearing can become habit forming. When you clear your throat, you are transferring mucus from your throat up into your mouth and nose. We all secrete up to 2 liters (imagine a big Coke bottle) of mucus a day. This saliva is usually swallowed and eventually ends up in the toilet. By clearing the mucus back into your mouth and nose you are sending secretions in " "the wrong direction, which is counter productive. The mucus will still work its way back down to the throat and eventually be swallowed. So, help the mucus go in the right direction in the first place! Swallow! Swallow! Swallow! No throat clearing.     Chronic throat clearing is damaging. The trauma from the throat clearing can cause redness and swelling of your vocal cords. If the clearing is very excessive small growths (granulomas) can form. The irritation and swelling caused by the clearing can cause saliva to sit in your throat. This causes more throat clearing. More throat clearing causes more stagnant mucus which causes more throat clearing which causes more mucus, etc... A vicious cycle will ensue and the habit can be very difficult to break. Without a conscious effort on your part to break the cycle, the throat clearing may never stop.     Your doctor may prescribe medication and behavioral modifications to treat acid reflux disease. Nose and throat spray may be prescribed to treat underlying allergies or asthma. Medications alone will not solve the problem. The following alterations are recommended:    1). Do not clear your throat. Swallow instead. This gets mucus going in the right direction.     2). Carry around some water to assist with swallowing and mucus clearance. When you feel the urge to clear your throat take a sip of water,    3). If you absolutely need to clear your throat, perform a non-traumatic throat clear. To do this, pant with your mouth open and say \"HUH, HUH, HUH, HUH\" with a powerful but very breathy voice. This will clear the secretions without causing damage.     4).  Increase water intake. This will thin secretions and make it easier to swallow.     5).  Comply with the behavior recommendations for reflux disease.     6). Encourage your friends and family to tell you to swallow when you clear your throat. Some people have been clearing so long that they don't even realize they are " doing it.     7).  Increase humidification: a dry environment can contribute to dry irritated tissues.  Use a cool mist humidifier at night and limit the use of heaters and air conditioning.     8). Don't overuse your voice, as this can also contribute to throat irritation. Limit loud talking as well as cell phone conversations.     9).  Be patient. The urge to clear your throat will not go away overnight. It may take 8 to 12 weeks for the medication and behavior modifications to work.     GOOD LUCK!

## 2024-01-25 NOTE — LETTER
2024     Román Weathers MD  [address] [facility] [address]    Patient: Jess Gaines   YOB: 1975   Date of Visit: 2024       Dear Dr. Román Weathers MD:    Thank you for referring Jess Gaines to me for evaluation. Below are my notes for this consultation.  If you have questions, please do not hesitate to call me. I look forward to following your patient along with you.       Sincerely,     Kiera Serna MD      CC: No Recipients  ______________________________________________________________________________________    Patient: Jess Gaines   MRN: [id number] YOB: 1975   Sex: male Age: 48 y.o.  Date of Service: 2024       ASSESSMENT AND PLAN  I discussed the findings with Jess Gaines and have recommended the followin. Dysphagia to solids and pills, history of GERD symptoms on PPI, esophagram without significant UES pathology though evaluation was limited.  - MBS ordered  - Follow-up after MBS to review next steps    2. 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 His throat clearing is behavioral as I do not see significant increased mucus today on laryngoscopy. I do not suspect LPRD. I have recommended voice therapy with our SLPs to start  - SLP referral for voice therapy   - Increase hydration and humidification  - Throat clearing and vocal hygiene strategies provided  - Repeat strobe on next visit    3. Post nasal drip, previously improved on flonase  - Flonase prescribed      CHIEF COMPLAINT  Chief Complaint   Patient presents with   • lprd     Needs to clear throat constantly  Dry throat  Voice change   • Chest Pain     Just finished 7 days of Amoxicillin       HISTORY OF PRESENT ILLNESS  Jess Gaines is a 48 y.o. male referred by Román Patrick MD for evaluation of dysphagia and  dysphonia.  The patient reports since fall 2022 he has been having reflux symptoms and trouble swallowing after COVID. Reflux symptoms since improved after PPI.    The dysphagia history includes:      Dysphagia for solids               yes, daily     Dysphagia for liquids              large amounts of liquid    Dysphagia for pills                  yes, breaks up his medications  Associated weight loss            no    Recent pneumonia/bronchitis  no  GERD/Acid reflux   Yes well controlled on PPI    Also notes some sensation of post nasal drop and sinus pressure. That was improved in the past with Flonase, which he is no longer using.    Dysphonia around the same time frame. 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 Denies pain with speaking but does note some irritation in his right neck. Thinks his right submandibular gland might be enlarged. No fevers or overlying erythema noted.    He has previously seen Dr. Grant 8/16/23. EGD 8/4/23 with regular Z line and non severe reflex esophagitis.     SH: never smoker    ADDITIONAL HISTORY  Past Medical History  He has no past medical history on file. Surgical History  He has no past surgical history on file.   Social History  He reports that he has never smoked. He has never used smokeless tobacco. He reports that he does not currently use alcohol. He reports that he does not currently use drugs. Allergies  Patient has no known allergies.     Family History  No family history on file.     REVIEW OF SYSTEMS  All 10 systems were reviewed and negative except for above.      PHYSICAL EXAM  ENT Physical Exam   GENERAL: Well-nourished and developed, alert and appropriate, no distress, voice [id number]  RESPIRATORY: Breathing quietly, no stridor  HEAD: Normocephalic atraumatic  FACE: Symmetric, no masses or lesions  EYES:  Pupils reactive, sclera clear, external ocular muscles intact, no nystagmus.    EARS:  Pinnae normal. External  "auditory canals clear and tympanic membranes intact.  NOSE:  No anterior lesions, masses or polyps.  ORAL CAVITY/OROPHARYNX:  Buccal mucosa is moist without lesions or masses, tongue midline and palate elevates symmetrically. Tongue mobility intact. Submandibular glands normal to palpation, normal salivary flow  NECK:  Soft. There is no lymphadenopathy or thyromegaly.  Moderated tenderness R>L thyrohyoid space  NEUROLOGIC:  Cranial nerves II-XII grossly intact.       Last Recorded Vitals  Temperature 36 °C (96.8 °F), height 1.702 m (5' 7\"), weight 62.4 kg (137 lb 8 oz).    RESULTS    Patient Reported Outcome Measures  N/A    Laboratory, Radiology, and Pathology  I personally reviewed the following results, with the following interpretation:   Esophagram 8/10/23 - no significant pharyngoesophageal dysfunction, mild intraesophageal reflux          PROCEDURES  Flexible Stroboscopy In Clinic    Date/Time: 1/25/2024 9:30 AM    Performed by: Kiera Serna MD  Authorized by: Kiera Serna MD       Flexible Fiberoptic Laryngoscopy with Stroboscopy    Patient failed a mirror exam due to limitations of equipment and the need for stroboscopy to assess glottic vibration and closure.     PREOPERATIVE DIAGNOSIS: Dysphonia    POSTOPERATIVE DIAGNOSIS: Same    PROCEDURE:  Strobovideolaryngoscopy    ANESTHESIA:  Topical    COMPLICATIONS:  None    SPECIMENS:  None    PROCEDURE IN DETAIL: The patient was seated in an upright position.  The nasal cavity was topically decongested and anesthetized.  The stroboscopic microphone was held to the neck at the level of the larynx.  The distal chip video laryngoscope was passed through the nasal cavity.  The nasal cavity and nasopharynx were within normal limits except noted below.  The following findings on stroboscopy were noted:      Appearance of pharynx/larynx/Other Structural Lesions: small right arytenoid granuloma   Vocal Fold Mobility               Right VF: mobile               Left VF: " mobile   TVF Appearance               Edema/Erythema: mild edema               Lesions/vibratory margin irregularities: mild atrophy   Glottic Closure Pattern: complete    Vibration:               Phase: symmetric    Periodicity: regular                Amplitude: normal                Waveform: normal     Muscle Tension Patterns:  moderate   Other abnormal findings: per above    The patient tolerated the procedure well.       ----------------------------------------------------------------------  Kiera Serna MD, MAEd    [facility]  Department of Otolaryngology - Head and Neck Surgery  [facility]    The total time I spent in care of this patient today (excluding time spent on other billable services) is as follows:    Time Spent  Prep time on day of patient encounter: 5 minutes  Time spent directly with patient, family or caregiver: 25 minutes  Additional Time Spent on Patient Care Activities: 5 minutes  Documentation Time: 10 minutes  Other Time Spent: 0 minutes  Total: 45 minutes

## 2024-02-12 ENCOUNTER — HOSPITAL ENCOUNTER (OUTPATIENT)
Dept: RADIOLOGY | Facility: HOSPITAL | Age: 49
Discharge: HOME | End: 2024-02-12
Payer: COMMERCIAL

## 2024-02-12 DIAGNOSIS — R49.0 DYSPHONIA: ICD-10-CM

## 2024-02-12 DIAGNOSIS — R13.10 DYSPHAGIA, UNSPECIFIED TYPE: ICD-10-CM

## 2024-02-12 PROCEDURE — 74230 X-RAY XM SWLNG FUNCJ C+: CPT

## 2024-02-12 PROCEDURE — 92611 MOTION FLUOROSCOPY/SWALLOW: CPT | Mod: GN

## 2024-02-12 PROCEDURE — 74230 X-RAY XM SWLNG FUNCJ C+: CPT | Performed by: RADIOLOGY

## 2024-02-12 PROCEDURE — 2500000001 HC RX 250 WO HCPCS SELF ADMINISTERED DRUGS (ALT 637 FOR MEDICARE OP): Performed by: OTOLARYNGOLOGY

## 2024-02-12 RX ADMIN — BARIUM SULFATE 60 ML: 400 SUSPENSION ORAL at 12:02

## 2024-02-12 RX ADMIN — BARIUM SULFATE 10 ML: 400 PASTE ORAL at 12:02

## 2024-02-12 RX ADMIN — BARIUM SULFATE 90 ML: 0.81 POWDER, FOR SUSPENSION ORAL at 12:00

## 2024-02-12 RX ADMIN — BARIUM SULFATE 700 MG: 700 TABLET ORAL at 12:02

## 2024-02-12 NOTE — PROCEDURES
Speech-Language Pathology    SLP Outpatient MBSS Evaluation    Patient Name: Jess Gaines  MRN: [id number]  Today's Date: 2/12/2024   Time Calculation  Start Time: 1050  Stop Time: 1145  Time Calculation (min): 55 min         Current Problem:   1. Dysphagia, unspecified type  FL modified barium swallow study    FL modified barium swallow study      2. Dysphonia  FL modified barium swallow study    FL modified barium swallow study        Pt c/o dysphagia to solids and pills, history of GERD symptoms on PPI, esophagram without significant UES pathology though evaluation was limited.  - MBS completed  - Pt to follow up with Dr. Serna for review of study and POC.    Recommendations/Treatment:  Regular diet with Thin Liquids.  Double swallow with thin liquids.  Small frequent meals throughout the day.  Complete oral care frequently throughout the day.    Assessment/Impression:  Mild pharyngeal dysphagia with concern for esophageal dysfunction impacting swallow efficiency. Pt with intact oral phase of the swallow. Pt with expected reoccurring transient penetration with consecutive sips of thin liquids. However, notable minimal amounts of thin liquid residues at the vallecular space and pyriform sinuses after the swallow. During consecutive sips of thin liquids the accumulating residues at the pyriform sinus; resulted in trace silent aspiration on thin liquids. Patient with productive cued cough and re-swallow; to clear aspirate materials. Pharyngeal efficiency was intact for all other viscosities and consistencies with trace amounts of barium coating within the cervical esophagus without retro-grade flow. Pt placed in a-p view with min barium retention of mildly thick at mid esophagus and retention of barium tablet at the distal portion of the esophagus. The A-P bolus follow-through is not intended to be utilized as a diagnostic assessment of the esophagus, rather a tool to observe the biomechanically aspects of the swallow  continuum and to inform the need for further evaluation by medical specialists, as applicable.     Oral Prepping: Intact  Oropharyngeal Onset: Intact  Posterior Lingual Propulsion: Intact  Velar Closure: Intact  Pharyngeal Constriction: Intact  Laryngeal Vestibular Closure: Intact  Upper Esophageal Opening: Impaired  Esophageal clearance/ a-p view:  Impaired     Education:   Pt. Educated on results of MBS study, recommended diet and recommended safe swallow strategies      Prognosis:   Good      Plan:  Follow up with Dr. Serna for discussion and review of results.  No further SLP services for dysphagia.      Alesia:  Consistencies/Score: Thin: 6 - Material enters airway, passes below cords, ejected via cough (1x with consecutive sips on trace amounts.)  Consistencies/Score: Nectar Liquid: 1 - Material does not enter airway  Consistencies/Score: Pudding/Puree: 1 - Material does not enter airway  Consistencies/Score: Solid: 1 - Material does not enter airway         Consultations/Referrals/Coordination of Services: n/a

## 2024-02-12 NOTE — PROGRESS NOTES
Speech-Language Pathology    Modified Barium Swallow Study completed; please see procedure note and radiology report.

## 2024-03-12 ENCOUNTER — OFFICE VISIT (OUTPATIENT)
Dept: OTOLARYNGOLOGY | Facility: CLINIC | Age: 49
End: 2024-03-12
Payer: COMMERCIAL

## 2024-03-12 VITALS — WEIGHT: 140 LBS | HEIGHT: 67 IN | TEMPERATURE: 97.3 F | BODY MASS INDEX: 21.97 KG/M2

## 2024-03-12 DIAGNOSIS — K21.9 GASTROESOPHAGEAL REFLUX DISEASE, UNSPECIFIED WHETHER ESOPHAGITIS PRESENT: ICD-10-CM

## 2024-03-12 DIAGNOSIS — R13.10 DYSPHAGIA, UNSPECIFIED TYPE: Primary | ICD-10-CM

## 2024-03-12 DIAGNOSIS — R09.82 POST-NASAL DRIP: ICD-10-CM

## 2024-03-12 DIAGNOSIS — R49.0 DYSPHONIA: ICD-10-CM

## 2024-03-12 PROCEDURE — 99215 OFFICE O/P EST HI 40 MIN: CPT | Performed by: OTOLARYNGOLOGY

## 2024-03-12 PROCEDURE — 31575 DIAGNOSTIC LARYNGOSCOPY: CPT | Performed by: OTOLARYNGOLOGY

## 2024-03-12 PROCEDURE — 1036F TOBACCO NON-USER: CPT | Performed by: OTOLARYNGOLOGY

## 2024-03-12 RX ORDER — SUCRALFATE 1 G/1
1 TABLET ORAL 2 TIMES DAILY PRN
Qty: 730 TABLET | Refills: 0 | Status: SHIPPED | OUTPATIENT
Start: 2024-03-12 | End: 2025-03-12

## 2024-03-12 ASSESSMENT — PATIENT HEALTH QUESTIONNAIRE - PHQ9
SUM OF ALL RESPONSES TO PHQ9 QUESTIONS 1 & 2: 0
2. FEELING DOWN, DEPRESSED OR HOPELESS: NOT AT ALL
1. LITTLE INTEREST OR PLEASURE IN DOING THINGS: NOT AT ALL

## 2024-03-12 NOTE — H&P (VIEW-ONLY)
Patient: Jess Gaines   MRN: [id number] YOB: 1975   Sex: male Age: 48 y.o.  Date of Service: 3/12/2024       ASSESSMENT AND PLAN  I discussed the findings with Jess Gaines and have recommended the followin. Dysphagia to solids and pills, history of GERD symptoms on PPI, esophagram without significant UES pathology though evaluation was limited. MBS without sig PES narrowing  - Schedule for esophagoscopy and empiric dilation of full esophagus in the endosuite, biopsies to rule out EoE Risks, benefits, and alternatives of esophagoscopy and dilation discussed with the patient, including but not limited to bleeding, infection, pain, need for repeat procedure, no improvement in symptoms, and rarely, esophageal perforation. The patient expressed understanding and agrees to proceed.   - Sucralfate BID as needed  - Alginate information provided    2. 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 His throat clearing is behavioral as I do not see significant increased mucus today on laryngoscopy. I do not suspect LPRD. I have recommended voice therapy with our SLPs to start  - SLP referral for voice therapy  - Increase hydration and humidification  - Throat clearing and vocal hygiene strategies provided     3. Post nasal drip, previously improved on flonase  - Continue Flonase       CHIEF COMPLAINT  Chief Complaint   Patient presents with    Follow-up       HISTORY OF PRESENT ILLNESS  Jess Gaines is a very kind 48 y.o. male who we have been following for dysphagia and dysphonia.     He had an MBS with [facility] that was relatively normal. He started flonase, which has helped his post nasal drip. He still has some voice fatigue and has not yet scheduled with SLP but the referral is in.    He still occasional gets some mild heartburn, still on a PPI every  morning    ADDITIONAL HISTORY  Past Medical History  He has no past medical history on file. Surgical History  He has no past surgical history on file.   Social History  He reports that he has never smoked. He has never used smokeless tobacco. He reports that he does not currently use alcohol. He reports that he does not currently use drugs. Allergies  Patient has no known allergies.     Family History  No family history on file.     REVIEW OF SYSTEMS  All 10 systems were reviewed and negative except for above.      PHYSICAL EXAM  ENT Physical Exam   GENERAL: Well-nourished and developed, alert and appropriate, no distress, voice [id number]  RESPIRATORY: Breathing quietly, no stridor  EYES:  Pupils reactive, sclera clear, external ocular muscles intact, no nystagmus.    EARS:  Pinnae normal. External auditory canals clear and tympanic membranes intact.  NOSE:  No anterior lesions, masses or polyps.  ORAL CAVITY/OROPHARYNX:  Buccal mucosa is moist without lesions or masses, tongue midline and palate elevates symmetrically.  NECK:  Soft. There is no lymphadenopathy or thyromegaly.    NEUROLOGIC:  Cranial nerves II-XII grossly intact.     Last Recorded Vitals  There were no vitals taken for this visit.    RESULTS    Patient Reported Outcome Measures  N/A    Laboratory, Radiology, and Pathology  I personally reviewed the following results, with the following interpretation:   MBS 2/12/24 - no significant CP bar/web though has mild residue with thins, no significant dysmotility,     PROCEDURES  Laryngoscopy    Date/Time: 3/12/2024 2:10 PM    Performed by: Kiera Serna MD  Authorized by: Kiera Serna MD         Flexible Fiberoptic Laryngoscopy     Patient failed a mirror exam due to limitations of equipment and the need for laryngoscopy to assess laryngeal anatomy and function    PREOPERATIVE DIAGNOSIS: dysphagia    POSTOPERATIVE DIAGNOSIS: Same    PROCEDURE: Transnasal videolaryngoscopy    ANESTHESIA:   Topical    COMPLICATIONS:  None    SPECIMENS:  None    PROCEDURE IN DETAIL:  The patient was brought into the endoscopy suite, placed in the upright position.  The nasal cavity was topically decongested anesthetized.  The distal chip video laryngoscope was passed through the nasal cavity.  The nasal cavity and nasopharynx were within normal limits except noted below. The following findings on laryngoscopy were noted:         Tongue Base: no masses or lesions          Left vocal fold mobility: mobile         Right vocal fold mobility: mobile         Glottal closure: complete         Laryngeal muscle tension: moderate         Symmetry: symmetric         Vocal fold free edge: no masses or lesions          Other abnormalities: small stable right vocal process granuloma    The patient tolerated the procedure well.        ----------------------------------------------------------------------  Kiera Serna MD, MAEd    [facility]  Department of Otolaryngology - Head and Neck Surgery  [facility]    The total time I spent in care of this patient today (excluding time spent on other billable services) is as follows:      Time Spent  Prep time on day of patient encounter: 10 minutes  Time spent directly with patient, family or caregiver: 20 minutes  Additional Time Spent on Patient Care Activities: 5 minutes  Documentation Time: 5 minutes  Other Time Spent: 0 minutes  Total: 40 minutes

## 2024-03-12 NOTE — PATIENT INSTRUCTIONS
These alginate products are available for purchase on [employer]:    Esophageal Guardian  Gaviscon Advance (must come from the [place])  Reflux Gourmet  RefluxRaft    Please take as directed.    Sample images. Product may appear different than pictured:

## 2024-03-12 NOTE — PROGRESS NOTES
Patient: Jess Gaines   MRN: [id number] YOB: 1975   Sex: male Age: 48 y.o.  Date of Service: 3/12/2024       ASSESSMENT AND PLAN  I discussed the findings with Jess Gaines and have recommended the followin. Dysphagia to solids and pills, history of GERD symptoms on PPI, esophagram without significant UES pathology though evaluation was limited. MBS without sig PES narrowing  - Schedule for esophagoscopy and empiric dilation of full esophagus in the endosuite, biopsies to rule out EoE Risks, benefits, and alternatives of esophagoscopy and dilation discussed with the patient, including but not limited to bleeding, infection, pain, need for repeat procedure, no improvement in symptoms, and rarely, esophageal perforation. The patient expressed understanding and agrees to proceed.   - Sucralfate BID as needed  - Alginate information provided    2. 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 His throat clearing is behavioral as I do not see significant increased mucus today on laryngoscopy. I do not suspect LPRD. I have recommended voice therapy with our SLPs to start  - SLP referral for voice therapy  - Increase hydration and humidification  - Throat clearing and vocal hygiene strategies provided     3. Post nasal drip, previously improved on flonase  - Continue Flonase       CHIEF COMPLAINT  Chief Complaint   Patient presents with    Follow-up       HISTORY OF PRESENT ILLNESS  Jess Gaines is a very kind 48 y.o. male who we have been following for dysphagia and dysphonia.     He had an MBS with [facility] that was relatively normal. He started flonase, which has helped his post nasal drip. He still has some voice fatigue and has not yet scheduled with SLP but the referral is in.    He still occasional gets some mild heartburn, still on a PPI every  morning    ADDITIONAL HISTORY  Past Medical History  He has no past medical history on file. Surgical History  He has no past surgical history on file.   Social History  He reports that he has never smoked. He has never used smokeless tobacco. He reports that he does not currently use alcohol. He reports that he does not currently use drugs. Allergies  Patient has no known allergies.     Family History  No family history on file.     REVIEW OF SYSTEMS  All 10 systems were reviewed and negative except for above.      PHYSICAL EXAM  ENT Physical Exam   GENERAL: Well-nourished and developed, alert and appropriate, no distress, voice [id number]  RESPIRATORY: Breathing quietly, no stridor  EYES:  Pupils reactive, sclera clear, external ocular muscles intact, no nystagmus.    EARS:  Pinnae normal. External auditory canals clear and tympanic membranes intact.  NOSE:  No anterior lesions, masses or polyps.  ORAL CAVITY/OROPHARYNX:  Buccal mucosa is moist without lesions or masses, tongue midline and palate elevates symmetrically.  NECK:  Soft. There is no lymphadenopathy or thyromegaly.    NEUROLOGIC:  Cranial nerves II-XII grossly intact.     Last Recorded Vitals  There were no vitals taken for this visit.    RESULTS    Patient Reported Outcome Measures  N/A    Laboratory, Radiology, and Pathology  I personally reviewed the following results, with the following interpretation:   MBS 2/12/24 - no significant CP bar/web though has mild residue with thins, no significant dysmotility,     PROCEDURES  Laryngoscopy    Date/Time: 3/12/2024 2:10 PM    Performed by: Kiera Serna MD  Authorized by: Kiera Serna MD         Flexible Fiberoptic Laryngoscopy     Patient failed a mirror exam due to limitations of equipment and the need for laryngoscopy to assess laryngeal anatomy and function    PREOPERATIVE DIAGNOSIS: dysphagia    POSTOPERATIVE DIAGNOSIS: Same    PROCEDURE: Transnasal videolaryngoscopy    ANESTHESIA:   Topical    COMPLICATIONS:  None    SPECIMENS:  None    PROCEDURE IN DETAIL:  The patient was brought into the endoscopy suite, placed in the upright position.  The nasal cavity was topically decongested anesthetized.  The distal chip video laryngoscope was passed through the nasal cavity.  The nasal cavity and nasopharynx were within normal limits except noted below. The following findings on laryngoscopy were noted:         Tongue Base: no masses or lesions          Left vocal fold mobility: mobile         Right vocal fold mobility: mobile         Glottal closure: complete         Laryngeal muscle tension: moderate         Symmetry: symmetric         Vocal fold free edge: no masses or lesions          Other abnormalities: small stable right vocal process granuloma    The patient tolerated the procedure well.        ----------------------------------------------------------------------  Kiera Serna MD, MAEd    [facility]  Department of Otolaryngology - Head and Neck Surgery  [facility]    The total time I spent in care of this patient today (excluding time spent on other billable services) is as follows:      Time Spent  Prep time on day of patient encounter: 10 minutes  Time spent directly with patient, family or caregiver: 20 minutes  Additional Time Spent on Patient Care Activities: 5 minutes  Documentation Time: 5 minutes  Other Time Spent: 0 minutes  Total: 40 minutes

## 2024-03-13 ENCOUNTER — APPOINTMENT (OUTPATIENT)
Dept: OTOLARYNGOLOGY | Facility: HOSPITAL | Age: 49
End: 2024-03-13
Payer: COMMERCIAL

## 2024-03-20 ENCOUNTER — APPOINTMENT (OUTPATIENT)
Dept: OTOLARYNGOLOGY | Facility: HOSPITAL | Age: 49
End: 2024-03-20
Payer: COMMERCIAL

## 2024-04-04 ENCOUNTER — HOSPITAL ENCOUNTER (OUTPATIENT)
Dept: GASTROENTEROLOGY | Facility: HOSPITAL | Age: 49
Setting detail: OUTPATIENT SURGERY
Discharge: HOME | End: 2024-04-04
Payer: COMMERCIAL

## 2024-04-04 VITALS
HEIGHT: 67 IN | DIASTOLIC BLOOD PRESSURE: 82 MMHG | HEART RATE: 72 BPM | WEIGHT: 140 LBS | SYSTOLIC BLOOD PRESSURE: 123 MMHG | RESPIRATION RATE: 19 BRPM | OXYGEN SATURATION: 100 % | BODY MASS INDEX: 21.97 KG/M2 | TEMPERATURE: 97.9 F

## 2024-04-04 DIAGNOSIS — Q39.4 CRICOPHARYNGEAL WEB (HHS-HCC): ICD-10-CM

## 2024-04-04 DIAGNOSIS — R13.10 DYSPHAGIA, UNSPECIFIED TYPE: Primary | ICD-10-CM

## 2024-04-04 PROCEDURE — 2720000007 HC OR 272 NO HCPCS: Performed by: OTOLARYNGOLOGY

## 2024-04-04 PROCEDURE — 43239 EGD BIOPSY SINGLE/MULTIPLE: CPT | Mod: 59 | Performed by: OTOLARYNGOLOGY

## 2024-04-04 PROCEDURE — C1726 CATH, BAL DIL, NON-VASCULAR: HCPCS | Performed by: OTOLARYNGOLOGY

## 2024-04-04 PROCEDURE — 88305 TISSUE EXAM BY PATHOLOGIST: CPT | Performed by: PATHOLOGY

## 2024-04-04 PROCEDURE — 7100000009 HC PHASE TWO TIME - INITIAL BASE CHARGE: Performed by: OTOLARYNGOLOGY

## 2024-04-04 PROCEDURE — 2500000004 HC RX 250 GENERAL PHARMACY W/ HCPCS (ALT 636 FOR OP/ED): Performed by: OTOLARYNGOLOGY

## 2024-04-04 PROCEDURE — 99152 MOD SED SAME PHYS/QHP 5/>YRS: CPT | Performed by: OTOLARYNGOLOGY

## 2024-04-04 PROCEDURE — 3700000012 HC SEDATION LEVEL 5+ TIME - INITIAL 15 MINUTES 5/> YEARS: Performed by: OTOLARYNGOLOGY

## 2024-04-04 PROCEDURE — 3700000013 HC SEDATION LEVEL 5+ TIME - EACH ADDITIONAL 15 MINUTES: Performed by: OTOLARYNGOLOGY

## 2024-04-04 PROCEDURE — 43249 ESOPH EGD DILATION <30 MM: CPT | Performed by: OTOLARYNGOLOGY

## 2024-04-04 PROCEDURE — 88305 TISSUE EXAM BY PATHOLOGIST: CPT | Mod: TC,SUR | Performed by: OTOLARYNGOLOGY

## 2024-04-04 PROCEDURE — 7100000010 HC PHASE TWO TIME - EACH INCREMENTAL 1 MINUTE: Performed by: OTOLARYNGOLOGY

## 2024-04-04 RX ORDER — FENTANYL CITRATE 50 UG/ML
INJECTION, SOLUTION INTRAMUSCULAR; INTRAVENOUS AS NEEDED
Status: COMPLETED | OUTPATIENT
Start: 2024-04-04 | End: 2024-04-04

## 2024-04-04 RX ORDER — SODIUM CHLORIDE 9 MG/ML
20 INJECTION, SOLUTION INTRAVENOUS CONTINUOUS
Status: DISCONTINUED | OUTPATIENT
Start: 2024-04-04 | End: 2024-04-05 | Stop reason: HOSPADM

## 2024-04-04 RX ORDER — FLUMAZENIL 0.1 MG/ML
0.2 INJECTION INTRAVENOUS ONCE AS NEEDED
Status: CANCELLED | OUTPATIENT
Start: 2024-04-04

## 2024-04-04 RX ORDER — ONDANSETRON HYDROCHLORIDE 2 MG/ML
4 INJECTION, SOLUTION INTRAVENOUS ONCE AS NEEDED
Status: CANCELLED | OUTPATIENT
Start: 2024-04-04

## 2024-04-04 RX ORDER — MIDAZOLAM HYDROCHLORIDE 1 MG/ML
INJECTION, SOLUTION INTRAMUSCULAR; INTRAVENOUS AS NEEDED
Status: COMPLETED | OUTPATIENT
Start: 2024-04-04 | End: 2024-04-04

## 2024-04-04 RX ORDER — NALOXONE HYDROCHLORIDE 0.4 MG/ML
0.2 INJECTION, SOLUTION INTRAMUSCULAR; INTRAVENOUS; SUBCUTANEOUS EVERY 5 MIN PRN
Status: CANCELLED | OUTPATIENT
Start: 2024-04-04

## 2024-04-04 RX ORDER — SODIUM CHLORIDE, SODIUM LACTATE, POTASSIUM CHLORIDE, CALCIUM CHLORIDE 600; 310; 30; 20 MG/100ML; MG/100ML; MG/100ML; MG/100ML
20 INJECTION, SOLUTION INTRAVENOUS CONTINUOUS
Status: DISCONTINUED | OUTPATIENT
Start: 2024-04-04 | End: 2024-04-05 | Stop reason: HOSPADM

## 2024-04-04 RX ADMIN — FENTANYL CITRATE 25 MCG: 50 INJECTION, SOLUTION INTRAMUSCULAR; INTRAVENOUS at 11:39

## 2024-04-04 RX ADMIN — MIDAZOLAM 1 MG: 1 INJECTION INTRAMUSCULAR; INTRAVENOUS at 11:35

## 2024-04-04 RX ADMIN — FENTANYL CITRATE 25 MCG: 50 INJECTION, SOLUTION INTRAMUSCULAR; INTRAVENOUS at 11:35

## 2024-04-04 RX ADMIN — MIDAZOLAM 1 MG: 1 INJECTION INTRAMUSCULAR; INTRAVENOUS at 11:46

## 2024-04-04 RX ADMIN — MIDAZOLAM 1 MG: 1 INJECTION INTRAMUSCULAR; INTRAVENOUS at 11:39

## 2024-04-04 ASSESSMENT — PAIN - FUNCTIONAL ASSESSMENT
PAIN_FUNCTIONAL_ASSESSMENT: 0-10

## 2024-04-04 ASSESSMENT — COLUMBIA-SUICIDE SEVERITY RATING SCALE - C-SSRS
2. HAVE YOU ACTUALLY HAD ANY THOUGHTS OF KILLING YOURSELF?: NO
1. IN THE PAST MONTH, HAVE YOU WISHED YOU WERE DEAD OR WISHED YOU COULD GO TO SLEEP AND NOT WAKE UP?: NO
6. HAVE YOU EVER DONE ANYTHING, STARTED TO DO ANYTHING, OR PREPARED TO DO ANYTHING TO END YOUR LIFE?: NO

## 2024-04-04 ASSESSMENT — PAIN SCALES - GENERAL
PAINLEVEL_OUTOF10: 0 - NO PAIN

## 2024-04-04 NOTE — DISCHARGE INSTRUCTIONS
Postoperative Instructions    General: Pain of the nose and throat can be normal after these procedures. A small amount of blood from the nose or mouth can be expected.  Diet: Start with liquids after anesthesia. Soft foods may feel best for the first 2-3 days following your procedure. Soft foods include soup, noodles, scrambled eggs, oatmeal, yogurt, smoothies, applesauce, mashed potatoes, pasta or ice cream. Avoid toast, chips, hard crusted breads, and steak or similar meats. After your throat begins to feel less sore, you can continue your normal diet.   Pain Control: You may experience a mild to moderate sore throat or tongue for several days following the procedure. The throat pain may also seem to cause earaches from referred pain. We encourage use of acetaminophen (Tylenol) and /or ibuprofen (Motrin/Advil) for most pain control. These two medications can be taken together or can be alternated. We will sometimes prescribe you something stronger for pain for “break through.”  Use it only as needed. Do not drive while taking any narcotic pain medications.  Follow-up: Your follow-up with your doctor should be scheduled 2-3 weeks following surgery. If a biopsy was preformed, results will usually be available in the system 7 days following the surgery. You will be informed of the results.   Please call the office or go to the emergency room if you experience any of the following: difficulty breathing, inability to swallow, severe chest pain, fever great than 101 degrees, significant bleeding, or any new/concerning symptoms.  Contact:  During office hours between 8 AM and 5 PM, please call Dr. Serna's office at [phone].  If you have an emergency over night or on the weekend, please call [phone] and ask the  to connect you to the ENT resident on call.

## 2024-04-10 LAB
LABORATORY COMMENT REPORT: NORMAL
PATH REPORT.FINAL DX SPEC: NORMAL
PATH REPORT.GROSS SPEC: NORMAL
PATH REPORT.TOTAL CANCER: NORMAL

## 2024-04-30 ENCOUNTER — APPOINTMENT (OUTPATIENT)
Dept: OTOLARYNGOLOGY | Facility: CLINIC | Age: 49
End: 2024-04-30
Payer: COMMERCIAL

## 2024-05-07 ENCOUNTER — APPOINTMENT (OUTPATIENT)
Dept: OTOLARYNGOLOGY | Facility: CLINIC | Age: 49
End: 2024-05-07
Payer: COMMERCIAL

## 2024-05-17 DIAGNOSIS — K21.00 GASTROESOPHAGEAL REFLUX DISEASE WITH ESOPHAGITIS WITHOUT HEMORRHAGE: ICD-10-CM

## 2024-05-17 RX ORDER — PANTOPRAZOLE SODIUM 40 MG/1
TABLET, DELAYED RELEASE ORAL
Qty: 90 TABLET | Refills: 3 | Status: SHIPPED | OUTPATIENT
Start: 2024-05-17

## 2024-05-21 ENCOUNTER — OFFICE VISIT (OUTPATIENT)
Dept: OTOLARYNGOLOGY | Facility: CLINIC | Age: 49
End: 2024-05-21
Payer: COMMERCIAL

## 2024-05-21 VITALS — HEIGHT: 66 IN | TEMPERATURE: 97.7 F | BODY MASS INDEX: 23.3 KG/M2 | WEIGHT: 145 LBS

## 2024-05-21 DIAGNOSIS — R49.0 DYSPHONIA: ICD-10-CM

## 2024-05-21 DIAGNOSIS — R13.10 DYSPHAGIA, UNSPECIFIED TYPE: Primary | ICD-10-CM

## 2024-05-21 PROCEDURE — 99214 OFFICE O/P EST MOD 30 MIN: CPT | Performed by: OTOLARYNGOLOGY

## 2024-05-21 PROCEDURE — 1036F TOBACCO NON-USER: CPT | Performed by: OTOLARYNGOLOGY

## 2024-05-21 PROCEDURE — 31575 DIAGNOSTIC LARYNGOSCOPY: CPT | Performed by: OTOLARYNGOLOGY

## 2024-05-21 ASSESSMENT — PATIENT HEALTH QUESTIONNAIRE - PHQ9
SUM OF ALL RESPONSES TO PHQ9 QUESTIONS 1 AND 2: 0
1. LITTLE INTEREST OR PLEASURE IN DOING THINGS: NOT AT ALL
2. FEELING DOWN, DEPRESSED OR HOPELESS: NOT AT ALL

## 2024-05-21 NOTE — PROGRESS NOTES
Patient: Jess Gaines   MRN: [id number] YOB: 1975   Sex: male Age: 48 y.o.  Date of Service: 2024       ASSESSMENT AND PLAN  I discussed the findings with Jess Gaines and have recommended the followin. Dysphagia to solids and pills, history of GERD symptoms now off PPI, esophagram without significant UES pathology though evaluation was limited. MBS without sig PES narrowing. S/p empiric esophageal dilation to 20mm on 24, biopsies normal.   - Repeat dilation as needed  - Alginates as needed      2. 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 Laryngoscopy today stable. I have encouraged voice therapy, referral in place  - He will schedule voice therapy when convenient  - Increase hydration and humidification  - Throat clearing and vocal hygiene strategies provided previously     3. Post nasal drip, improved on flonase  - Continue Flonase if beneficial but ok to wean if desired      CHIEF COMPLAINT  Chief Complaint   Patient presents with    Follow-up       HISTORY OF PRESENT ILLNESS  Jess Gaines is a very kind 48 y.o. male who we have been following for dysphagia and dysphonia s/p esophagoscopy and empiric esophageal dilation to 20mm on 24, biopsies normal. History of heartburn symptoms.    He reports his swallowing overall feels improved. He also started taking Esophageal Guardian which helps. He is using flonase but wonders if it was ok to stop. He was previously referred to [facility] for voice therapy but has not scheduled as he was traveling.    ADDITIONAL HISTORY  Past Medical History  He has a past medical history of GERD (gastroesophageal reflux disease). Surgical History  He has no past surgical history on file.   Social History  He reports that he has never smoked. He has never used smokeless tobacco. He reports that  "he does not currently use alcohol. He reports that he does not currently use drugs. Allergies  Patient has no known allergies.     Family History  No family history on file.     REVIEW OF SYSTEMS  All 10 systems were reviewed and negative except for above.      PHYSICAL EXAM  ENT Physical Exam   GENERAL: Well-nourished and developed, alert and appropriate, no distress, voice [id number]  RESPIRATORY: Breathing quietly, no stridor  EYES:  Pupils reactive, sclera clear, external ocular muscles intact, no nystagmus.    EARS:  Pinnae normal. External auditory canals clear and tympanic membranes intact.  NOSE:  No anterior lesions, masses or polyps.  ORAL CAVITY/OROPHARYNX:  Buccal mucosa is moist without lesions or masses, tongue midline and palate elevates symmetrically.  NECK:  Soft. There is no lymphadenopathy or thyromegaly.    NEUROLOGIC:  Cranial nerves II-XII grossly intact.     Last Recorded Vitals  Temperature 36.5 °C (97.7 °F), height 1.676 m (5' 6\"), weight 65.8 kg (145 lb).    RESULTS    Patient Reported Outcome Measures  N/A    Laboratory, Radiology, and Pathology  I personally reviewed the following results, with the following interpretation:   Pathology esophageal biopsies 4/4/24, normal    PROCEDURES  Laryngoscopy    Date/Time: 5/21/2024 9:13 PM    Performed by: Kiera Serna MD  Authorized by: Kiera Serna MD       Flexible Fiberoptic Laryngoscopy     Patient failed a mirror exam due to limitations of equipment and the need for laryngoscopy to assess laryngeal anatomy and function    PREOPERATIVE DIAGNOSIS: dysphagia, dysphonia    POSTOPERATIVE DIAGNOSIS: Same    PROCEDURE: Transnasal videolaryngoscopy    ANESTHESIA:  Topical    COMPLICATIONS:  None    SPECIMENS:  None    PROCEDURE IN DETAIL:  The patient was brought into the endoscopy suite, placed in the upright position.  The nasal cavity was topically decongested anesthetized.  The distal chip video laryngoscope was passed through the nasal cavity.  " The nasal cavity and nasopharynx were within normal limits except noted below. The following findings on laryngoscopy were noted:         Tongue Base: no masses or lesions          Left vocal fold mobility: mobile         Right vocal fold mobility: mobile         Glottal closure: complete         Laryngeal muscle tension: moderate false fold         Symmetry: symmetric         Vocal fold free edge: no masses or lesions, mild strophy         Other: minimal pooled secretions right pyriform    The patient tolerated the procedure well.          ----------------------------------------------------------------------  Kiera Serna MD, MAEd    [facility]  Department of Otolaryngology - Head and Neck Surgery  [facility]    The total time I spent in care of this patient today (excluding time spent on other billable services) is as follows:    Time Spent  Prep time on day of patient encounter: 5 minutes  Time spent directly with patient, family or caregiver: 15 minutes  Additional Time Spent on Patient Care Activities: 5 minutes  Documentation Time: 5 minutes  Other Time Spent: 0 minutes  Total: 30 minutes

## 2024-08-06 ENCOUNTER — APPOINTMENT (OUTPATIENT)
Dept: PRIMARY CARE | Facility: CLINIC | Age: 49
End: 2024-08-06
Payer: COMMERCIAL

## 2025-08-22 ENCOUNTER — OFFICE VISIT (OUTPATIENT)
Dept: PRIMARY CARE | Facility: CLINIC | Age: 50
End: 2025-08-22
Payer: COMMERCIAL

## 2025-08-22 VITALS
RESPIRATION RATE: 16 BRPM | DIASTOLIC BLOOD PRESSURE: 80 MMHG | WEIGHT: 145.4 LBS | BODY MASS INDEX: 23.47 KG/M2 | HEART RATE: 68 BPM | SYSTOLIC BLOOD PRESSURE: 120 MMHG | TEMPERATURE: 98.2 F

## 2025-08-22 DIAGNOSIS — J01.00 ACUTE MAXILLARY SINUSITIS, RECURRENCE NOT SPECIFIED: Primary | ICD-10-CM

## 2025-08-22 DIAGNOSIS — J32.9 SINUSITIS, UNSPECIFIED CHRONICITY, UNSPECIFIED LOCATION: ICD-10-CM

## 2025-08-22 DIAGNOSIS — Q39.4 CRICOPHARYNGEAL WEB (HHS-HCC): ICD-10-CM

## 2025-08-22 DIAGNOSIS — K21.9 LPRD (LARYNGOPHARYNGEAL REFLUX DISEASE): ICD-10-CM

## 2025-08-22 PROCEDURE — 1036F TOBACCO NON-USER: CPT | Performed by: INTERNAL MEDICINE

## 2025-08-22 PROCEDURE — 99213 OFFICE O/P EST LOW 20 MIN: CPT | Performed by: INTERNAL MEDICINE

## 2025-08-22 RX ORDER — AMOXICILLIN 500 MG/1
500 CAPSULE ORAL EVERY 8 HOURS SCHEDULED
Qty: 21 CAPSULE | Refills: 0 | Status: SHIPPED | OUTPATIENT
Start: 2025-08-22 | End: 2025-08-29

## 2025-08-22 RX ORDER — AZELASTINE HYDROCHLORIDE, FLUTICASONE PROPIONATE 137; 50 UG/1; UG/1
1 SPRAY, METERED NASAL 2 TIMES DAILY
Qty: 23 EACH | Refills: 1 | Status: SHIPPED | OUTPATIENT
Start: 2025-08-22

## 2025-08-22 ASSESSMENT — ENCOUNTER SYMPTOMS
NECK PAIN: 0
DIARRHEA: 0
COUGH: 0
ABDOMINAL PAIN: 0
HEADACHES: 1
SORE THROAT: 1
RHINORRHEA: 0
VOMITING: 0

## 2025-11-21 ENCOUNTER — APPOINTMENT (OUTPATIENT)
Dept: PRIMARY CARE | Facility: CLINIC | Age: 50
End: 2025-11-21
Payer: COMMERCIAL